# Patient Record
(demographics unavailable — no encounter records)

---

## 2025-04-23 NOTE — ASSESSMENT
[FreeTextEntry1] : 11 M with right hip labral tear. Right hip and thigh pain.   Clinical findings, x-ray and MRI results were reviewed at length with the patient and parent. MRI of the right hip obtained at [facility] on 4/01/2025 showed findings consistent with a subacute subchondral fracture of the superior and superomedial femoral head with irregular up to 2 mm impaction of the subchondral plate. Tear through the anterior superior and anterior labrum with moderate sized paralabral cyst. Cam morphology of the anterior superior femoral head neck junction. Mild trochanteric bursal thickening. mild iliopsoas bursitis. Mild chondral thinning over the central to medial aspect of the femoral head. I did not see evidence of hip dysplasia. There is evidence of repetitive micro trauma to the hip.  Her leg length x-rays do reveal approximately a 1.5 cm limb length discrepancy with the right leg being longer.  Our plan at this time is for the patient to undergo a full month of rest from activities. He will continue to non/partial weightbear with Lofstrand crutches to offload the pressure on the right side. At follow up visit, we will obtain repeat AP/Frog pelvis XRs. If there is no further collapse visualized at this time, we will continue to monitor him with non or partial weightbearing.  The patient will remain out of all physical activities including gym, sports, and fencing; school note was provided to the family today with accommodations for elevator pass, backpack helper and extra time between classes.   Today's visit included obtaining the history from the child and parent, due to the child's age, the child could not be considered a reliable historian, requiring the parent to act as an independent historian. The condition, natural history, and prognosis were explained to the patient and family. The clinical findings and images were reviewed with the family. All questions answered. Family expressed understanding and agreement with the above.   Follow up in 1 month for repeat XRs AP/Frog Pelvis and clinical reevaluation. If no further collapse, continue monitoring with non or partial weightbearing and activity restrictions    Documented by Makenzie Patel acting as a scribe for Dr. Live on 04/22/2025.   The above documentation completed by the scribe is an accurate record of both my words and actions.

## 2025-04-23 NOTE — ASSESSMENT
[FreeTextEntry1] : 11 M with right hip labral tear. Right hip and thigh pain.   Clinical findings, x-ray and MRI results were reviewed at length with the patient and parent. MRI of the right hip obtained at [facility] on 4/01/2025 showed findings consistent with a subacute subchondral fracture of the superior and superomedial femoral head with irregular up to 2 mm impaction of the subchondral plate. Tear through the anterior superior and anterior labrum with moderate sized paralabral cyst. Cam morphology of the anterior superior femoral head neck junction. Mild trochanteric bursal thickening. mild iliopsoas bursitis. Mild chondral thinning over the central to medial aspect of the femoral head. I did not see evidence of hip dysplasia. There is evidence of repetitive micro trauma to the hip.  Her leg length x-rays do reveal approximately a 1.5 cm limb length discrepancy with the right leg being longer.  Our plan at this time is for the patient to undergo a full month of rest from activities. He will continue to non/partial weightbear with Lofstrand crutches to offload the pressure on the right side. At follow up visit, we will obtain repeat AP/Frog pelvis XRs. If there is no further collapse visualized at this time, we will continue to monitor him with non or partial weightbearing.  The patient will remain out of all physical activities including gym, sports, and fencing; school note was provided to the family today with accommodations for elevator pass, backpack helper and extra time between classes.   Today's visit included obtaining the history from the child and parent, due to the child's age, the child could not be considered a reliable historian, requiring the parent to act as an independent historian. The condition, natural history, and prognosis were explained to the patient and family. The clinical findings and images were reviewed with the family. All questions answered. Family expressed understanding and agreement with the above.   Follow up in 1 month for repeat XRs AP/Frog Pelvis and clinical reevaluation. If no further collapse, continue monitoring with non or partial weightbearing and activity restrictions    Documented by Makenzie Patel acting as a scribe for Dr. Live on 04/22/2025.   The above documentation completed by the scribe is an accurate record of both my words and actions.

## 2025-04-23 NOTE — DATA REVIEWED
[de-identified] : My interpretation of imaging done on 4/22/2025:  AP leg length XRs show irregularity at the central portion of the femoral head on the right side. Left hip is concentric.  AP/Frog Pelvis XRs   R hip MRI 4/01/2025 obtained at [facility]:  1. Subacute subchondral fracture of the superior and and superomedial femoral head with irregular up to 2 mm impaction of the subchondral plate 2. Tear through the anterior superior and anterior labrum with moderate sized paralabral cyst 3. Hip dysplasia. acetabular retroversion 4. Cam morphology of the anterior superior femoral head neck junction  5. Mild trochanteric bursal thickening. mild iliopsoas bursitis 6. Mild chondral thinning over the centrla to medial aspect of the femoral head

## 2025-04-23 NOTE — DATA REVIEWED
[de-identified] : My interpretation of imaging done on 4/22/2025:  AP leg length XRs show irregularity at the central portion of the femoral head on the right side. Left hip is concentric.  AP/Frog Pelvis XRs   R hip MRI 4/01/2025 obtained at [facility]:  1. Subacute subchondral fracture of the superior and and superomedial femoral head with irregular up to 2 mm impaction of the subchondral plate 2. Tear through the anterior superior and anterior labrum with moderate sized paralabral cyst 3. Hip dysplasia. acetabular retroversion 4. Cam morphology of the anterior superior femoral head neck junction  5. Mild trochanteric bursal thickening. mild iliopsoas bursitis 6. Mild chondral thinning over the centrla to medial aspect of the femoral head

## 2025-04-23 NOTE — REVIEW OF SYSTEMS
[NI] : Endocrine [Nl] : Hematologic/Lymphatic [Joint Pains] : arthralgias [Change in Activity] : no change in activity [Fever Above 102] : no fever [Malaise] : no malaise [Rash] : no rash [Murmur] : no murmur [Cough] : no cough

## 2025-04-23 NOTE — PHYSICAL EXAM
[FreeTextEntry1] : General: WDWN, acting appropriate for age. HEENT: NCAT, Normal conjunctiva Cardio: Appears well perfused, no peripheral edema, brisk cap refill. Lungs: no obvious increased WOB, no audible wheeze heard without use of stethoscope. Abdomen: not examined. Skin: No visible rashes on exposed skin  Gait: ambulating with Lofstrand crutches   LE: Skin clean and intact. No deformity or lymphedema. Full ROM bilateral knees and ankles. 5/5 motor strength in LE. SILT distally. DP 2+, BCR < 2 seconds.   Right hip focused exam:  Pain with external and internal rotation. Limited ER/IR No muscle atrophy noted Stiffness noted

## 2025-04-23 NOTE — HISTORY OF PRESENT ILLNESS
[FreeTextEntry1] : 11-year-old male presents today with his parents for an initial evaluation of his right hip/thigh pain. Patient reports that for several months, he has been experiencing right thigh and hip pain. He was recently seen by a specialist who sent him for an MRI of the right hip. He is here today for evaluation and to review the results. He reports pain with abduction and adduction. He has been ambulating with lofstrand crutches for 3 weeks to offload pressure on the right lower extremity. The patient is an active fencer. He denies any recent fevers, chills or night sweats. Denies any recent trauma or injuries. He denies any back pain, radiating pain, numbness, tingling sensations, discomfort, weakness to the LE, radiating LE pain, or bladder/bowel dysfunction. Parents deny any known family history for hip related issues. Here for initial orthopedic evaluation.

## 2025-04-23 NOTE — REASON FOR VISIT
[Post Urgent Care] : a post urgent care visit [Patient] : patient [Parents] : parents [FreeTextEntry1] : Right hip/thigh pain, pelvic tilt

## 2025-05-22 NOTE — DATA REVIEWED
[de-identified] : My review and interpretation of the radiologic studies: Interpretation of imaging done today, 5/20/25: AP/Frog Pelvis XRs show left side is concentric. Increased joint space.   Interpretation of imaging done on 4/22/2025:  AP leg length XRs show irregularity at the central portion of the femoral head on the right side. Left hip is concentric.   R hip MRI 4/01/2025 obtained at [facility]:  1. Subacute subchondral fracture of the superior and and superomedial femoral head with irregular up to 2 mm impaction of the subchondral plate 2. Tear through the anterior superior and anterior labrum with moderate sized paralabral cyst 3. Hip dysplasia. acetabular retroversion 4. Cam morphology of the anterior superior femoral head neck junction  5. Mild trochanteric bursal thickening. mild iliopsoas bursitis 6. Mild chondral thinning over the centrla to medial aspect of the femoral head

## 2025-05-22 NOTE — HISTORY OF PRESENT ILLNESS
[FreeTextEntry1] : Carlos Eduardo is a 11-year-old male presents today with his parents for a follow up evaluation for a right hip labral tear. At the previous visit, the patient had complaints of right hip/thigh pain that had lasted for several months. Previous MRI of the right hip obtained at [facility] on 4/01/2025 showed findings consistent with a subacute subchondral fracture of the superior and superomedial femoral head with irregular up to 2 mm impaction of the subchondral plate. Tear through the anterior superior and anterior labrum with moderate sized paralabral cyst. At that time, the plan was  to rest from all activities and to continue to non/partial weightbear with Lofstrand crutches.  Today, patient is here for follow up and revaluation. He reports that he has had minimal physical activity and hasn't been attending school. In addition, the patient reports that he has been using the Lofstrand crutches for ambulation and does not have pain while using them. However, he reports that he does feel pain upon internal or external rotation of the right lower extremity. He denies any recent fevers, chills or night sweats. Denies any recent trauma or injuries. He denies any back pain, radiating pain, numbness, tingling sensations, discomfort, weakness to the LE, radiating LE pain, or bladder/bowel dysfunction. Parents deny any known family history for hip related issues.

## 2025-05-22 NOTE — ASSESSMENT
[FreeTextEntry1] : Max is a 11 M with right hip labral tear and chronic right hip and thigh pain. Questionable [id number]-fdccyw disease.  Clinical findings and x-ray results were reviewed at length with the patient and parent. Images were remarkable for mild remodeling of the femoral head. Our plan at this time is for the patient to have the patient continue to non/partial weightbear with Lofstrand crutches to offload the pressure on the right side. Additionally, I would like to obtain an MRI of patient's right hip to rule out bkdj-bpebot-waseza disease. Our  will contact family with MRI authorization. Follow-up will occur once the patient and their family obtain MRI results.  At follow up visit, we will obtain repeat AP/Frog pelvis XRs IN [facility] and reevaluate. If there is no further collapse visualized at this time, we will continue to monitor him with non or partial weightbearing. At this time, the patient should refrain from all physical activities including gym, sports, and fencing; school note was provided to the family today with accommodations for elevator pass, backpack helper and extra time between classes.   Today's visit included obtaining the history from the child and parent, due to the child's age, the child could not be considered a reliable historian, requiring the parent to act as an independent historian. The condition, natural history, and prognosis were explained to the patient and family. The clinical findings and images were reviewed with the family. All questions answered. Family expressed understanding and agreement with the above.  Documented by Micheline Wellington acting as a scribe for Dr. Live on 05/20/2025.  The above documentation completed by the scribe is an accurate record of both my words and actions.

## 2025-05-22 NOTE — HISTORY OF PRESENT ILLNESS
[FreeTextEntry1] : Carlos Eduardo is a 11-year-old male presents today with his parents for a follow up evaluation for a right hip labral tear. At the previous visit, the patient had complaints of right hip/thigh pain that had lasted for several months. Previous MRI of the right hip obtained at [facility] on 4/01/2025 showed findings consistent with a subacute subchondral fracture of the superior and superomedial femoral head with irregular up to 2 mm impaction of the subchondral plate. Tear through the anterior superior and anterior labrum with moderate sized paralabral cyst. At that time, the plan was  to rest from all activities and to continue to non/partial weightbear with Lofstrand crutches.  Today, patient is here for follow up and revaluation. He reports that he has had minimal physical activity and hasn't been attending school. In addition, the patient reports that he has been using the Lofstrand crutches for ambulation and does not have pain while using them. However, he reports that he does feel pain upon internal or external rotation of the right lower extremity. He denies any recent fevers, chills or night sweats. Denies any recent trauma or injuries. He denies any back pain, radiating pain, numbness, tingling sensations, discomfort, weakness to the LE, radiating LE pain, or bladder/bowel dysfunction. Parents deny any known family history for hip related issues.

## 2025-05-22 NOTE — REASON FOR VISIT
[Patient] : patient [Parents] : parents [Follow Up] : a follow up visit [FreeTextEntry1] : Right hip/thigh pain, pelvic tilt

## 2025-05-22 NOTE — ASSESSMENT
[FreeTextEntry1] : Max is a 11 M with right hip labral tear and chronic right hip and thigh pain. Questionable [id number]-ljgugu disease.  Clinical findings and x-ray results were reviewed at length with the patient and parent. Images were remarkable for mild remodeling of the femoral head. Our plan at this time is for the patient to have the patient continue to non/partial weightbear with Lofstrand crutches to offload the pressure on the right side. Additionally, I would like to obtain an MRI of patient's right hip to rule out crni-tbkitg-imtduu disease. Our  will contact family with MRI authorization. Follow-up will occur once the patient and their family obtain MRI results.  At follow up visit, we will obtain repeat AP/Frog pelvis XRs IN [facility] and reevaluate. If there is no further collapse visualized at this time, we will continue to monitor him with non or partial weightbearing. At this time, the patient should refrain from all physical activities including gym, sports, and fencing; school note was provided to the family today with accommodations for elevator pass, backpack helper and extra time between classes.   Today's visit included obtaining the history from the child and parent, due to the child's age, the child could not be considered a reliable historian, requiring the parent to act as an independent historian. The condition, natural history, and prognosis were explained to the patient and family. The clinical findings and images were reviewed with the family. All questions answered. Family expressed understanding and agreement with the above.  Documented by Micheline Wellington acting as a scribe for Dr. Live on 05/20/2025.  The above documentation completed by the scribe is an accurate record of both my words and actions.

## 2025-05-22 NOTE — HISTORY OF PRESENT ILLNESS
[FreeTextEntry1] : Carlos Eduardo is a 11-year-old male presents today with his parents for a follow up evaluation for a right hip labral tear. At the previous visit, the patient had complaints of right hip/thigh pain that had lasted for several months. Previous MRI of the right hip obtained at [facility] on 4/01/2025 showed findings consistent with a subacute subchondral fracture of the superior and superomedial femoral head with irregular up to 2 mm impaction of the subchondral plate. Tear through the anterior superior and anterior labrum with moderate sized paralabral cyst. At that time, the plan was  to rest from all activities and to continue to non/partial weightbear with Lofstrand crutches.  Today, patient is here for follow up and revaluation. He reports that he has had minimal physical activity and hasn't been attending school. In addition, the patient reports that he has been using the Lofstrand crutches for ambulation and does not have pain while using them. However, he reports that he does feel pain upon internal or external rotation of the right lower extremity. He denies any recent fevers, chills or night sweats. Denies any recent trauma or injuries. He denies any back pain, radiating pain, numbness, tingling sensations, discomfort, weakness to the LE, radiating LE pain, or bladder/bowel dysfunction. Parents deny any known family history for hip related issues.

## 2025-05-22 NOTE — ASSESSMENT
[FreeTextEntry1] : Max is a 11 M with right hip labral tear and chronic right hip and thigh pain. Questionable wlgv-ogbfze-qucbyk disease.  Clinical findings and x-ray results were reviewed at length with the patient and parent. Images were remarkable for mild remodeling of the femoral head. Our plan at this time is for the patient to have the patient continue to non/partial weightbear with Lofstrand crutches to offload the pressure on the right side. Additionally, I would like to obtain an MRI of patient's right hip to rule out qfqk-podcnk-mwuuhe disease. Our  will contact family with MRI authorization. Follow-up will occur once the patient and their family obtain MRI results.  At follow up visit, we will obtain repeat AP/Frog pelvis XRs IN [facility] and reevaluate. If there is no further collapse visualized at this time, we will continue to monitor him with non or partial weightbearing. At this time, the patient should refrain from all physical activities including gym, sports, and fencing; school note was provided to the family today with accommodations for elevator pass, backpack helper and extra time between classes.   Today's visit included obtaining the history from the child and parent, due to the child's age, the child could not be considered a reliable historian, requiring the parent to act as an independent historian. The condition, natural history, and prognosis were explained to the patient and family. The clinical findings and images were reviewed with the family. All questions answered. Family expressed understanding and agreement with the above.  Documented by Micheline Wellington acting as a scribe for Dr. Live on 05/20/2025.  The above documentation completed by the scribe is an accurate record of both my words and actions.

## 2025-05-22 NOTE — DATA REVIEWED
[de-identified] : My review and interpretation of the radiologic studies: Interpretation of imaging done today, 5/20/25: AP/Frog Pelvis XRs show left side is concentric. Increased joint space.   Interpretation of imaging done on 4/22/2025:  AP leg length XRs show irregularity at the central portion of the femoral head on the right side. Left hip is concentric.   R hip MRI 4/01/2025 obtained at [facility]:  1. Subacute subchondral fracture of the superior and and superomedial femoral head with irregular up to 2 mm impaction of the subchondral plate 2. Tear through the anterior superior and anterior labrum with moderate sized paralabral cyst 3. Hip dysplasia. acetabular retroversion 4. Cam morphology of the anterior superior femoral head neck junction  5. Mild trochanteric bursal thickening. mild iliopsoas bursitis 6. Mild chondral thinning over the centrla to medial aspect of the femoral head

## 2025-05-22 NOTE — DATA REVIEWED
[de-identified] : My review and interpretation of the radiologic studies: Interpretation of imaging done today, 5/20/25: AP/Frog Pelvis XRs show left side is concentric. Increased joint space.   Interpretation of imaging done on 4/22/2025:  AP leg length XRs show irregularity at the central portion of the femoral head on the right side. Left hip is concentric.   R hip MRI 4/01/2025 obtained at [facility]:  1. Subacute subchondral fracture of the superior and and superomedial femoral head with irregular up to 2 mm impaction of the subchondral plate 2. Tear through the anterior superior and anterior labrum with moderate sized paralabral cyst 3. Hip dysplasia. acetabular retroversion 4. Cam morphology of the anterior superior femoral head neck junction  5. Mild trochanteric bursal thickening. mild iliopsoas bursitis 6. Mild chondral thinning over the centrla to medial aspect of the femoral head

## 2025-05-27 NOTE — END OF VISIT
Patient would like to know what she should do because this is an urgent matter. She wants to know if she should go to the ER if that gets her seen sooner.     Let patient know doctor is trying to see what the best solution is for her to be seen as soon as possible.    [Time Spent: ___ minutes] : I have spent [unfilled] minutes of time on the encounter which excludes teaching and separately reported services.